# Patient Record
Sex: FEMALE | Race: WHITE | NOT HISPANIC OR LATINO | Employment: OTHER | URBAN - METROPOLITAN AREA
[De-identification: names, ages, dates, MRNs, and addresses within clinical notes are randomized per-mention and may not be internally consistent; named-entity substitution may affect disease eponyms.]

---

## 2017-08-25 ENCOUNTER — GENERIC CONVERSION - ENCOUNTER (OUTPATIENT)
Dept: OTHER | Facility: OTHER | Age: 72
End: 2017-08-25

## 2017-08-29 ENCOUNTER — ALLSCRIPTS OFFICE VISIT (OUTPATIENT)
Dept: OTHER | Facility: OTHER | Age: 72
End: 2017-08-29

## 2017-08-29 DIAGNOSIS — Z00.00 ENCOUNTER FOR GENERAL ADULT MEDICAL EXAMINATION WITHOUT ABNORMAL FINDINGS: ICD-10-CM

## 2017-08-29 DIAGNOSIS — Z12.31 ENCOUNTER FOR SCREENING MAMMOGRAM FOR MALIGNANT NEOPLASM OF BREAST: ICD-10-CM

## 2017-08-29 DIAGNOSIS — M85.80 OTHER SPECIFIED DISORDERS OF BONE DENSITY AND STRUCTURE, UNSPECIFIED SITE: ICD-10-CM

## 2017-10-05 ENCOUNTER — GENERIC CONVERSION - ENCOUNTER (OUTPATIENT)
Dept: OTHER | Facility: OTHER | Age: 72
End: 2017-10-05

## 2017-10-17 ENCOUNTER — GENERIC CONVERSION - ENCOUNTER (OUTPATIENT)
Dept: OTHER | Facility: OTHER | Age: 72
End: 2017-10-17

## 2018-01-14 VITALS
DIASTOLIC BLOOD PRESSURE: 74 MMHG | BODY MASS INDEX: 37.08 KG/M2 | SYSTOLIC BLOOD PRESSURE: 124 MMHG | HEIGHT: 62 IN | WEIGHT: 201.5 LBS

## 2018-01-15 NOTE — MISCELLANEOUS
Message   Recorded as Task   Date: 10/16/2017 09:55 PM, Created By: Bryanna Cramer   Task Name: Review Document   [facility]   Regarding Patient: Cyrilla Fothergill, Status: Active   Comment:    Bryanna Cramer - 16 Oct 2017 9:55 PM     TASK CREATED  mild osteopenia in the hip, normal lumbar spine, ca, vit D and weight bearing exercise   Keysha Zambrano - 17 Oct 2017 10:16 AM     TASK EDITED  pt informed        Active Problems    1  Breast lump (611 72) (N63)   2  Breast self examination education, encounter for (V65 49) (Z71 89)   3  Encounter for routine gynecological examination (V72 31) (Z01 419)   4  Encounter for screening mammogram for malignant neoplasm of breast (V76 12)   (Z12 31)   5  Osteopenia (733 90) (M85 80)    Current Meds   1  Diclofenac-Misoprostol 75-0 2 MG Oral Tablet Delayed Release; Therapy: (Recorded:33Qni7052) to Recorded   2  HydroCHLOROthiazide 12 5 MG Oral Capsule; Therapy: 88NFH1985 to (Last Rx:13Jan2012)  Requested for: 24YMS6720 Ordered   3  Levothyroxine Sodium 88 MCG Oral Tablet; Therapy: (Recorded:05Cae8313) to Recorded   4  Multivitamins TABS; Therapy: (Recorded:63Wcj1754) to Recorded   5  Travatan Z 0 004 % Ophthalmic Solution; Therapy: 93LQC5195 to (Last Rx:11Oct2011)  Requested for: 11Oct2011 Ordered    Allergies    1   Benadryl TABS    Signatures   Electronically signed by : Damien Su, ; Oct 17 2017 10:17AM EST                       (Author)

## 2018-07-13 ENCOUNTER — TELEPHONE (OUTPATIENT)
Dept: OBGYN CLINIC | Facility: CLINIC | Age: 73
End: 2018-07-13

## 2020-11-10 PROBLEM — E03.9 ACQUIRED HYPOTHYROIDISM: Status: ACTIVE | Noted: 2018-09-11

## 2020-11-24 ENCOUNTER — TELEPHONE (OUTPATIENT)
Dept: OTHER | Facility: OTHER | Age: 75
End: 2020-11-24

## 2020-11-25 PROCEDURE — 88305 TISSUE EXAM BY PATHOLOGIST: CPT | Performed by: PATHOLOGY

## 2020-11-27 ENCOUNTER — LAB REQUISITION (OUTPATIENT)
Dept: LAB | Facility: HOSPITAL | Age: 75
End: 2020-11-27
Payer: MEDICARE

## 2020-11-27 DIAGNOSIS — R07.9 CHEST PAIN, UNSPECIFIED: ICD-10-CM

## 2021-01-05 NOTE — PROGRESS NOTES
Assessment/Plan:    pap is up to date - she does not require a pap    mammogram reviewed with her including breast density  RX given for September  Discussed self breast exams    colon cancer screening - she just had a colonoscopy this year  Abdominal/pelvic pain- she believes this is from her diverticular disease  It seems to be getting better  We had discussed pelvic ultrasound although she is pretty sure her ovaries were removed  She did have a normal CT of her pelvis in October  Urgency, urge incontinence and stress incontinence-we reviewed avoiding bladder irritants, Kegel exercises and bladder training  She was given information on this to review  If this is not helpful, she could consider pelvic floor PT or medication  DEXA ordered    discussed preventive care, regular exercise and a healthy diet      No problem-specific Assessment & Plan notes found for this encounter  Diagnoses and all orders for this visit:    Encounter for annual routine gynecological examination    Encounter for screening mammogram for breast cancer  -     Mammo screening bilateral w 3d & cad; Future    Urgency of urination    Urge incontinence    KAYLIN (stress urinary incontinence, female)    Asymptomatic menopause  -     DXA bone density spine hip and pelvis; Future    Osteopenia, unspecified location  -     DXA bone density spine hip and pelvis; Future    Other orders  -     diclofenac-misoprostol (ARTHROTEC 75) 75-0 2 MG per tablet  -     polyethylene glycol-propylene glycol (SYSTANE) 0 4-0 3 %; PRN OU  -     levothyroxine 75 mcg tablet  -     diclofenac (VOLTAREN) 75 mg EC tablet;  Take 75 mg by mouth  -     Diclofenac Sodium (VOLTAREN) 1 %; diclofenac 1 % topical gel   APPLY TWO GRAMS TO THE AFFECTED AREA(S) TOPICALLY FOUR TIMES DAILY  -     Ergocalciferol (Vitamin D2) 10 MCG (400 UNIT) TABS  -     Calcium Carbonate Antacid (Antacid) 1177 MG CHEW          Subjective:      Patient ID: Bassam Sainz is a 70 y o  female  Patient here for yearly  Her last visit was in 2017  She is having urgency and urge incontinence  She also has some KAYLIN  She wears a pad daily  She was having some abdominal and pelvic pain  She recently had a colonoscopy in E[place] and was diagnosed with diverticular disease  She is watching her diet and does feel that it is improving  She also has external rectal bleeding  She has frequent bowel movements  Normal 3D mammogram in September 2020 that showed scattered fibroglandular densities  Colonoscopy done in 2020 also  S/p hysterectomy, believes she had BSO, not sure  She had hot flashes immediately after surgery  Recent CT of pelvis showed no sign of pelvic mass and made no mention of reproductive organs  She is overdue for a DEXA scan  The following portions of the patient's history were reviewed and updated as appropriate: allergies, current medications, past family history, past medical history, past social history, past surgical history and problem list     Review of Systems   Constitutional: Negative  Gastrointestinal: Positive for abdominal pain  Genitourinary: Positive for frequency and urgency  Objective: There were no vitals taken for this visit  Physical Exam  Vitals signs reviewed  Constitutional:       Appearance: She is well-developed  Neck:      Thyroid: No thyromegaly  Trachea: No tracheal deviation  Cardiovascular:      Rate and Rhythm: Normal rate and regular rhythm  Pulmonary:      Effort: Pulmonary effort is normal       Breath sounds: Normal breath sounds  Chest:      Breasts: Breasts are symmetrical          Right: No inverted nipple, mass, nipple discharge, skin change or tenderness  Left: No inverted nipple, mass, nipple discharge, skin change or tenderness  Abdominal:      General: There is no distension  Palpations: Abdomen is soft  There is no mass  Tenderness:  There is no abdominal tenderness  Genitourinary:     Labia:         Right: No rash, tenderness, lesion or injury  Left: No rash, tenderness, lesion or injury  Vagina: Normal       Uterus: Absent  Adnexa:         Right: No mass, tenderness or fullness  Left: No mass, tenderness or fullness          Rectum: Normal       Comments:  Uterus and cervix are surgically absent

## 2021-01-07 ENCOUNTER — ANNUAL EXAM (OUTPATIENT)
Dept: OBGYN CLINIC | Facility: CLINIC | Age: 76
End: 2021-01-07
Payer: MEDICARE

## 2021-01-07 VITALS
DIASTOLIC BLOOD PRESSURE: 80 MMHG | SYSTOLIC BLOOD PRESSURE: 132 MMHG | BODY MASS INDEX: 36.25 KG/M2 | HEIGHT: 62 IN | WEIGHT: 197 LBS

## 2021-01-07 DIAGNOSIS — Z78.0 ASYMPTOMATIC MENOPAUSE: ICD-10-CM

## 2021-01-07 DIAGNOSIS — Z12.31 ENCOUNTER FOR SCREENING MAMMOGRAM FOR BREAST CANCER: ICD-10-CM

## 2021-01-07 DIAGNOSIS — N39.41 URGE INCONTINENCE: ICD-10-CM

## 2021-01-07 DIAGNOSIS — R39.15 URGENCY OF URINATION: ICD-10-CM

## 2021-01-07 DIAGNOSIS — Z01.419 ENCOUNTER FOR ANNUAL ROUTINE GYNECOLOGICAL EXAMINATION: Primary | ICD-10-CM

## 2021-01-07 DIAGNOSIS — N39.3 SUI (STRESS URINARY INCONTINENCE, FEMALE): ICD-10-CM

## 2021-01-07 DIAGNOSIS — M85.80 OSTEOPENIA, UNSPECIFIED LOCATION: ICD-10-CM

## 2021-01-07 PROCEDURE — G0101 CA SCREEN;PELVIC/BREAST EXAM: HCPCS | Performed by: OBSTETRICS & GYNECOLOGY

## 2021-01-07 RX ORDER — CHOLECALCIFEROL (VITAMIN D3) 50 MCG
CAPSULE ORAL
COMMUNITY

## 2021-01-07 RX ORDER — LEVOTHYROXINE SODIUM 0.07 MG/1
TABLET ORAL
COMMUNITY
Start: 2020-12-28

## 2021-01-07 RX ORDER — ERGOCALCIFEROL (VITAMIN D2) 10 MCG
TABLET ORAL
COMMUNITY

## 2021-01-07 RX ORDER — DICLOFENAC SODIUM AND MISOPROSTOL 75; 200 MG/1; UG/1
TABLET, DELAYED RELEASE ORAL
COMMUNITY
Start: 2020-12-05

## 2021-01-07 RX ORDER — DICLOFENAC SODIUM 75 MG/1
75 TABLET, DELAYED RELEASE ORAL
COMMUNITY

## 2021-03-10 DIAGNOSIS — Z23 ENCOUNTER FOR IMMUNIZATION: ICD-10-CM

## 2021-03-17 ENCOUNTER — IMMUNIZATIONS (OUTPATIENT)
Dept: FAMILY MEDICINE CLINIC | Facility: HOSPITAL | Age: 76
End: 2021-03-17

## 2021-03-17 DIAGNOSIS — Z23 ENCOUNTER FOR IMMUNIZATION: Primary | ICD-10-CM

## 2021-03-17 PROCEDURE — 0011A SARS-COV-2 / COVID-19 MRNA VACCINE (MODERNA) 100 MCG: CPT

## 2021-03-17 PROCEDURE — 91301 SARS-COV-2 / COVID-19 MRNA VACCINE (MODERNA) 100 MCG: CPT

## 2021-04-15 ENCOUNTER — IMMUNIZATIONS (OUTPATIENT)
Dept: FAMILY MEDICINE CLINIC | Facility: HOSPITAL | Age: 76
End: 2021-04-15

## 2021-04-15 DIAGNOSIS — Z23 ENCOUNTER FOR IMMUNIZATION: Primary | ICD-10-CM

## 2021-04-15 PROCEDURE — 91301 SARS-COV-2 / COVID-19 MRNA VACCINE (MODERNA) 100 MCG: CPT

## 2021-04-15 PROCEDURE — 0012A SARS-COV-2 / COVID-19 MRNA VACCINE (MODERNA) 100 MCG: CPT

## 2021-04-23 ENCOUNTER — HOSPITAL ENCOUNTER (OUTPATIENT)
Dept: BONE DENSITY | Facility: MEDICAL CENTER | Age: 76
Discharge: HOME | End: 2021-04-23
Payer: MEDICARE

## 2021-04-23 ENCOUNTER — OFFICE VISIT (OUTPATIENT)
Dept: URGENT CARE | Facility: MEDICAL CENTER | Age: 76
End: 2021-04-23
Payer: MEDICARE

## 2021-04-23 VITALS
HEART RATE: 76 BPM | BODY MASS INDEX: 36.44 KG/M2 | SYSTOLIC BLOOD PRESSURE: 137 MMHG | OXYGEN SATURATION: 97 % | HEIGHT: 62 IN | DIASTOLIC BLOOD PRESSURE: 63 MMHG | TEMPERATURE: 97.2 F | WEIGHT: 198 LBS | RESPIRATION RATE: 20 BRPM

## 2021-04-23 DIAGNOSIS — M25.462 PAIN AND SWELLING OF KNEE, LEFT: Primary | ICD-10-CM

## 2021-04-23 DIAGNOSIS — M85.80 OSTEOPENIA, UNSPECIFIED LOCATION: ICD-10-CM

## 2021-04-23 DIAGNOSIS — M25.562 PAIN AND SWELLING OF KNEE, LEFT: Primary | ICD-10-CM

## 2021-04-23 DIAGNOSIS — Z78.0 ASYMPTOMATIC MENOPAUSE: ICD-10-CM

## 2021-04-23 PROCEDURE — G0463 HOSPITAL OUTPT CLINIC VISIT: HCPCS | Performed by: PHYSICIAN ASSISTANT

## 2021-04-23 PROCEDURE — 77080 DXA BONE DENSITY AXIAL: CPT

## 2021-04-23 PROCEDURE — 99213 OFFICE O/P EST LOW 20 MIN: CPT | Performed by: PHYSICIAN ASSISTANT

## 2021-04-23 NOTE — PROGRESS NOTES
[facility]        NAME: Mario Alberto Smith is a 70 y o  female  : 1945    MRN: [id number]  DATE: 2021  TIME: 11:54 AM    Assessment and Plan   Pain and swelling of knee, left [M25 562, M25 462]  1  Pain and swelling of knee, left           Patient Instructions       Patient was educated on swelling in posterior left knee  Patient reported she was concerned about blood clots  Patient was educated the only way to evaluate for DVT'S is with a venous doppler  Patient was told I recommend she call PCP or go directly to ED to evaluated  Patient understood  Patient was also given a referral for ortho  Chief Complaint     Chief Complaint   Patient presents with    Knee Swelling     Patient states she has swelling behind her L knee  She does take diclofenac for same  She has need having pain for about a week  Patient was offered left knee x-rays but declined  History of Present Illness       Patient presents today complaining of swelling in left knee  Patient reports this swelling started a couple of weeks ago  Patient reports pain with ambulation and pain when touching posterior aspect of left knee and left upper thigh  Patient has a history of right total knee replacement and atelectasis  Patient is currently taking diclofenac as prescribed for pain  Patient has not receieve any left knee injections  Review of Systems   Review of Systems   Constitutional: Negative  Respiratory: Negative  Cardiovascular: Negative  Musculoskeletal:        Left knee pain and swelling  Skin:        Left lower leg varicose veins   Neurological: Negative  Psychiatric/Behavioral: Negative            Current Medications       Current Outpatient Medications:     B Complex Vitamins (B COMPLEX 1 PO), Take 1 capsule by mouth daily, Disp: , Rfl:     Bioflavonoid Products (BIOFLEX PO), Take 1 tablet by mouth daily, Disp: , Rfl:     Calcium Carbonate Antacid (Antacid) 1177 MG CHEW, , Disp: , Rfl:     diclofenac-misoprostol (ARTHROTEC 75) 75-0 2 MG per tablet, , Disp: , Rfl:     Ergocalciferol (Vitamin D2) 10 MCG (400 UNIT) TABS, , Disp: , Rfl:     Flaxseed, Linseed, (Flaxseed Oil) 1000 MG CAPS, Take 1,000 mg by mouth daily, Disp: , Rfl:     levothyroxine 75 mcg tablet, , Disp: , Rfl:     Multiple Vitamin (MULTIVITAMINS PO), Take 1 tablet by mouth daily, Disp: , Rfl:     Omega-3 Fatty Acids (fish oil) 1,000 mg, Take 1 g by mouth daily, Disp: , Rfl:     Probiotic Product (PROBIOTIC DAILY PO), Take 1 tablet by mouth daily, Disp: , Rfl:     travoprost (TRAVATAN-Z) 0 004 % ophthalmic solution, Apply 1 drop to eye daily at bedtime, Disp: , Rfl:     BETA CAROTENE PO, Take 2 tablets by mouth daily, Disp: , Rfl:     Cholecalciferol 25 MCG (1000 UT) tablet, Take 1,000 Units by mouth daily, Disp: , Rfl:     diclofenac (VOLTAREN) 75 mg EC tablet, Take 75 mg by mouth, Disp: , Rfl:     Diclofenac Sodium (VOLTAREN) 1 %, diclofenac 1 % topical gel  APPLY TWO GRAMS TO THE AFFECTED AREA(S) TOPICALLY FOUR TIMES DAILY, Disp: , Rfl:     DICLOFENAC-MISOPROSTOL PO, Take 1 tablet by mouth daily, Disp: , Rfl:     hydrochlorothiazide (MICROZIDE) 12 5 mg capsule, Take 12 5 mg by mouth daily, Disp: , Rfl:     levothyroxine 88 mcg tablet, Take 75 mcg by mouth daily , Disp: , Rfl:     polyethylene glycol-propylene glycol (SYSTANE) 0 4-0 3 %, PRN OU, Disp: , Rfl:     Suprep Bowel Prep Kit 17 5-3 13-1 6 GM/177ML SOLN, Take 1 Bottle by mouth once for 1 dose, Disp: 1 Bottle, Rfl: 0    Current Allergies     Allergies as of 04/23/2021 - Reviewed 04/23/2021   Allergen Reaction Noted    Terbinafine Other (See Comments) 09/11/2018    Wound dressing adhesive Rash 11/11/2020    Medical tape Rash 05/13/2015            The following portions of the patient's history were reviewed and updated as appropriate: allergies, current medications, past family history, past medical history, past social history, past surgical history and problem list      Past Medical History:   Diagnosis Date    Fibroid     Hypothyroidism     Osteoarthritis        Past Surgical History:   Procedure Laterality Date    CHOLECYSTECTOMY      COLONOSCOPY  11/2020    diverticulosis and grade II hemorrhoids    COLONOSCOPY  2013    focal acute inflammation  Dr Ben Juarez EGD  11/2020    multiple fundic gland polyps  Biopsies negative for H  pylori    FLEXIBLE SIGMOIDOSCOPY  12/2015    hemorrhoids, diverticulosis, hyperplastic rectal polyp removed    HYSTERECTOMY      REPLACEMENT TOTAL KNEE Right        Family History   Problem Relation Age of Onset    Breast cancer Mother     Brain cancer Mother     Lung cancer Mother          Medications have been verified  Objective   /63   Pulse 76   Temp (!) 97 2 °F (36 2 °C)   Resp 20   Ht 5' 2" (1 575 m)   Wt 89 8 kg (198 lb)   SpO2 97%   BMI 36 21 kg/m²   No LMP recorded  Patient is postmenopausal        Physical Exam     Physical Exam  Constitutional:       Appearance: She is normal weight  HENT:      Head: Normocephalic  Cardiovascular:      Rate and Rhythm: Normal rate and regular rhythm  Heart sounds: Normal heart sounds  Pulmonary:      Breath sounds: Normal breath sounds  Musculoskeletal:      Comments: No pain or tenderness in left calf  Swelling noted on posterior aspect of left knee  Pain over medial and lateral joint line of left knee  Left knee range of motion intact  Pain over posterior aspect of left knee  NO laxity with valgus or varus stress in left knee  Negative Segundo in left knee  Negative SLR in left knee  No pain with left knee resisted knee flexion and extension  Palpable mass noted on lower right posterior thigh  Neurological:      Mental Status: She is alert and oriented to person, place, and time     Psychiatric:         Mood and Affect: Mood normal          Behavior: Behavior normal

## 2021-04-23 NOTE — PATIENT INSTRUCTIONS
Patient was educated on swelling in posterior left knee  Patient reported she was concerned about blood clots  Patient was educated the only way to evaluate for DVT'S is with a venous doppler  Patient was told I recommend she call PCP or go directly to ED to evaluated  Patient understood  Patient was also given a referral for ortho  Swollen Joint   WHAT YOU NEED TO KNOW:   Joint swelling may occur in one or more joints  You may have other symptoms, such as pain, tenderness, or stiffness  A swollen joint may be caused by a variety of conditions such as arthritis, pseudogout, gout, tendinitis, or injury  DISCHARGE INSTRUCTIONS:   Return to the emergency department if:   · You cannot move your joint at all  · You have severe pain that does not get better with medicine  Contact your healthcare provider if:   · You have a fever  · You have redness or warmth over the joint  · The swelling does not decrease with treatment  · You have questions or concerns about your condition or care  Medicines:   · NSAIDs , such as ibuprofen, help decrease swelling, pain, and fever  This medicine is available with or without a doctor's order  NSAIDs can cause stomach bleeding or kidney problems in certain people  If you take blood thinner medicine, always ask your healthcare provider if NSAIDs are safe for you  Always read the medicine label and follow directions  · Take your medicine as directed  Contact your healthcare provider if you think your medicine is not helping or if you have side effects  Tell him of her if you are allergic to any medicine  Keep a list of the medicines, vitamins, and herbs you take  Include the amounts, and when and why you take them  Bring the list or the pill bottles to follow-up visits  Carry your medicine list with you in case of an emergency  Follow up with your healthcare provider as directed:  Write down your questions so you remember to ask them during your visits  Self-care:   · Rest  your swollen joint  Avoid activities that make the swelling or pain worse  You may need to avoid putting weight on your joint while you have pain  Crutches or a walker can be used to avoid putting weight on joints in your lower body  · Apply ice  on your swollen joint for 15 to 20 minutes every hour or as directed  Use an ice pack, or put crushed ice in a plastic bag  Cover it with a towel  Ice helps prevent tissue damage and decreases swelling and pain  · Apply heat  on your swollen joint for 20 to 30 minutes every 2 hours for as many days as directed  Heat helps decrease pain  · Elevate  your swollen joint above the level of your heart as often as you can  This will help decrease swelling and pain  Prop your joint on pillows or blankets to keep it elevated comfortably  © Copyright [address] Information is for End User's use only and may not be sold, redistributed or otherwise used for commercial purposes  All illustrations and images included in CareNotes® are the copyrighted property of A D A M , Inc  or [address] [place], [place] [address]darlene   The above information is an  only  It is not intended as medical advice for individual conditions or treatments  Talk to your doctor, nurse or pharmacist before following any medical regimen to see if it is safe and effective for you

## 2021-05-25 ENCOUNTER — TELEPHONE (OUTPATIENT)
Dept: OBGYN CLINIC | Facility: HOSPITAL | Age: 76
End: 2021-05-25

## 2021-05-25 NOTE — TELEPHONE ENCOUNTER
Patient is calling in wanting to make an appointment to be seen with Rheumatology in which I provided soonest appointment we had for a new patient and she stated that was too long, she cannot wait that long

## 2022-10-07 DIAGNOSIS — Z12.31 ENCOUNTER FOR SCREENING MAMMOGRAM FOR BREAST CANCER: ICD-10-CM

## 2023-01-09 RX ORDER — METHOTREXATE 25 MG/ML
INJECTION, SOLUTION INTRA-ARTERIAL; INTRAMUSCULAR; INTRAVENOUS WEEKLY
COMMUNITY

## 2023-01-09 NOTE — PRE-PROCEDURE INSTRUCTIONS
Pre-Surgery Instructions:   Medication Instructions   • B Complex Vitamins (B COMPLEX 1 PO) Hold until after surgery   • BETA CAROTENE PO Hold until after surgery   • Bioflavonoid Products (BIOFLEX PO) Hold until after surgery   • Calcium Carbonate Antacid (Antacid) 1177 MG CHEW Hold day of surgery  • Cholecalciferol 25 MCG (1000 UT) tablet Hold until after surgery   • CORAL CALCIUM PO Hold until after surgery   • Ergocalciferol (Vitamin D2) 10 MCG (400 UNIT) TABS Hold until after surgery   • Flaxseed, Linseed, (Flaxseed Oil) 1000 MG CAPS Hold until after surgery   • folic acid (FOLVITE) 1 mg tablet Hold until after surgery   • Glucosamine-Chondroitin (COSAMIN DS PO) Hold until after surgery   • levothyroxine 75 mcg tablet Take day of surgery  • methotrexate 50 MG/2ML injection Instructions provided by MD   • Multiple Vitamin (MULTIVITAMINS PO) Hold until after surgery   • Omega-3 Fatty Acids (fish oil) 1,000 mg Hold until after surgery   • polyethylene glycol-propylene glycol (SYSTANE) 0 4-0 3 % Hold day of surgery  • Probiotic Product (PROBIOTIC DAILY PO) Hold day of surgery  • travoprost (TRAVATAN-Z) 0 004 % ophthalmic solution Take day of surgery  - Reviewed with patient, in detail, instructions from "My Surgical Experience"  - Instructed to avoid all OTC vitamins/supplements and NSAIDS from now until after surgery per anesthesia guidelines  Tylenol ok to take PRN  - Advised patient nothing eat or drink after midnight prior to surgery, except medications that he/she is to take morning DOS with only small sip of water     - Advised patient that Luis Stark will call with surgery arrival time and hospital directions the business day prior to surgery   Patient verbalized understanding of current visitor restrictions/masking guidelines and advised that he/she can confirm these at time of arrival call with Luis Stark      - Patient verbalized understanding and knows to call surgeon's office with any additional questions prior to surgery  Instructed to call surgeon's office in meantime with any new illnesses/exposure, patient verbalized understanding

## 2023-01-10 ENCOUNTER — ANESTHESIA EVENT (OUTPATIENT)
Dept: PERIOP | Facility: HOSPITAL | Age: 78
End: 2023-01-10

## 2023-01-10 PROBLEM — K21.9 GASTROESOPHAGEAL REFLUX DISEASE: Status: ACTIVE | Noted: 2023-01-10

## 2023-01-10 PROBLEM — H40.9 GLAUCOMA: Status: ACTIVE | Noted: 2023-01-10

## 2023-01-10 PROBLEM — R56.9 SEIZURES (HCC): Status: ACTIVE | Noted: 2023-01-10

## 2023-01-10 PROBLEM — M06.9 RHEUMATOID ARTHRITIS (HCC): Status: ACTIVE | Noted: 2023-01-10

## 2023-01-10 NOTE — ANESTHESIA PREPROCEDURE EVALUATION
Procedure:  REMOVE SHOULDER LESION WITH LOCAL FLAP (Left: Shoulder)    Relevant Problems   ENDO   (+) Acquired hypothyroidism      GI/HEPATIC   (+) Gastroesophageal reflux disease      MUSCULOSKELETAL   (+) Rheumatoid arthritis (HCC)      NEURO/PSYCH   (+) Seizures (HCC)      Musculoskeletal and Integument   (+) Osteopenia        Physical Exam    Airway    Mallampati score: II  TM Distance: >3 FB  Neck ROM: full     Dental       Cardiovascular  Cardiovascular exam normal    Pulmonary  Pulmonary exam normal     Other Findings        Anesthesia Plan  ASA Score- 2     Anesthesia Type- IV sedation with anesthesia with ASA Monitors  Additional Monitors:   Airway Plan:           Plan Factors-Exercise tolerance (METS): >4 METS  Chart reviewed  EKG reviewed  Existing labs reviewed  Patient summary reviewed  Patient is not a current smoker  Patient not instructed to abstain from smoking on day of procedure  Patient did not smoke on day of surgery  Induction-     Postoperative Plan- Plan for postoperative opioid use  Informed Consent- Anesthetic plan and risks discussed with patient and spouse  I personally reviewed this patient with the CRNA  Discussed and agreed on the Anesthesia Plan with the CRNA  Cody Farr           No results found for: HGBA1C    No results found for: NA, K, CL, CO2, ANIONGAP, BUN, CREATININE, GLUCOSE, GLUF, CALCIUM, CORRECTEDCA, AST, ALT, ALKPHOS, PROT, BILITOT, EGFR    No results found for: WBC, HGB, HCT, MCV, PLT  ekg NSR with RBBB    Cbc and bmp noted

## 2023-01-11 ENCOUNTER — ANESTHESIA (OUTPATIENT)
Dept: PERIOP | Facility: HOSPITAL | Age: 78
End: 2023-01-11

## 2023-01-11 ENCOUNTER — HOSPITAL ENCOUNTER (OUTPATIENT)
Facility: HOSPITAL | Age: 78
Setting detail: OUTPATIENT SURGERY
Discharge: HOME | End: 2023-01-11
Attending: PLASTIC SURGERY | Admitting: PLASTIC SURGERY

## 2023-01-11 VITALS
BODY MASS INDEX: 36.99 KG/M2 | OXYGEN SATURATION: 97 % | WEIGHT: 201 LBS | HEIGHT: 62 IN | SYSTOLIC BLOOD PRESSURE: 132 MMHG | TEMPERATURE: 97.4 F | HEART RATE: 69 BPM | RESPIRATION RATE: 16 BRPM | DIASTOLIC BLOOD PRESSURE: 64 MMHG

## 2023-01-11 DIAGNOSIS — C43.62 MALIGNANT MELANOMA OF LEFT UPPER LIMB, INCLUDING SHOULDER (HCC): ICD-10-CM

## 2023-01-11 RX ORDER — KETAMINE HCL IN NACL, ISO-OSM 100MG/10ML
SYRINGE (ML) INJECTION AS NEEDED
Status: DISCONTINUED | OUTPATIENT
Start: 2023-01-11 | End: 2023-01-11

## 2023-01-11 RX ORDER — FENTANYL CITRATE 50 UG/ML
INJECTION, SOLUTION INTRAMUSCULAR; INTRAVENOUS AS NEEDED
Status: DISCONTINUED | OUTPATIENT
Start: 2023-01-11 | End: 2023-01-11

## 2023-01-11 RX ORDER — CEFAZOLIN SODIUM 2 G/50ML
2000 SOLUTION INTRAVENOUS ONCE
Status: COMPLETED | OUTPATIENT
Start: 2023-01-11 | End: 2023-01-11

## 2023-01-11 RX ORDER — ONDANSETRON 4 MG/1
4 TABLET, ORALLY DISINTEGRATING ORAL EVERY 6 HOURS PRN
Status: DISCONTINUED | OUTPATIENT
Start: 2023-01-11 | End: 2023-01-11 | Stop reason: HOSPADM

## 2023-01-11 RX ORDER — ACETAMINOPHEN 325 MG/1
TABLET ORAL
Status: DISCONTINUED
Start: 2023-01-11 | End: 2023-01-11 | Stop reason: HOSPADM

## 2023-01-11 RX ORDER — SODIUM CHLORIDE, SODIUM LACTATE, POTASSIUM CHLORIDE, CALCIUM CHLORIDE 600; 310; 30; 20 MG/100ML; MG/100ML; MG/100ML; MG/100ML
INJECTION, SOLUTION INTRAVENOUS CONTINUOUS PRN
Status: DISCONTINUED | OUTPATIENT
Start: 2023-01-11 | End: 2023-01-11

## 2023-01-11 RX ORDER — SODIUM CHLORIDE, SODIUM LACTATE, POTASSIUM CHLORIDE, CALCIUM CHLORIDE 600; 310; 30; 20 MG/100ML; MG/100ML; MG/100ML; MG/100ML
75 INJECTION, SOLUTION INTRAVENOUS CONTINUOUS
Status: DISCONTINUED | OUTPATIENT
Start: 2023-01-11 | End: 2023-01-11 | Stop reason: HOSPADM

## 2023-01-11 RX ORDER — FENTANYL CITRATE/PF 50 MCG/ML
25 SYRINGE (ML) INJECTION
Status: DISCONTINUED | OUTPATIENT
Start: 2023-01-11 | End: 2023-01-11 | Stop reason: HOSPADM

## 2023-01-11 RX ORDER — ONDANSETRON 2 MG/ML
4 INJECTION INTRAMUSCULAR; INTRAVENOUS ONCE AS NEEDED
Status: DISCONTINUED | OUTPATIENT
Start: 2023-01-11 | End: 2023-01-11 | Stop reason: HOSPADM

## 2023-01-11 RX ORDER — PROPOFOL 10 MG/ML
INJECTION, EMULSION INTRAVENOUS CONTINUOUS PRN
Status: DISCONTINUED | OUTPATIENT
Start: 2023-01-11 | End: 2023-01-11

## 2023-01-11 RX ORDER — MAGNESIUM HYDROXIDE 1200 MG/15ML
LIQUID ORAL AS NEEDED
Status: DISCONTINUED | OUTPATIENT
Start: 2023-01-11 | End: 2023-01-11 | Stop reason: HOSPADM

## 2023-01-11 RX ORDER — LIDOCAINE HYDROCHLORIDE AND EPINEPHRINE 10; 10 MG/ML; UG/ML
INJECTION, SOLUTION INFILTRATION; PERINEURAL AS NEEDED
Status: DISCONTINUED | OUTPATIENT
Start: 2023-01-11 | End: 2023-01-11 | Stop reason: HOSPADM

## 2023-01-11 RX ORDER — ACETAMINOPHEN 325 MG/1
650 TABLET ORAL ONCE
Status: COMPLETED | OUTPATIENT
Start: 2023-01-11 | End: 2023-01-11

## 2023-01-11 RX ORDER — SODIUM CHLORIDE, SODIUM LACTATE, POTASSIUM CHLORIDE, CALCIUM CHLORIDE 600; 310; 30; 20 MG/100ML; MG/100ML; MG/100ML; MG/100ML
50 INJECTION, SOLUTION INTRAVENOUS CONTINUOUS
Status: DISCONTINUED | OUTPATIENT
Start: 2023-01-11 | End: 2023-01-11 | Stop reason: HOSPADM

## 2023-01-11 RX ADMIN — FENTANYL CITRATE 25 MCG: 50 INJECTION, SOLUTION INTRAMUSCULAR; INTRAVENOUS at 08:22

## 2023-01-11 RX ADMIN — CEFAZOLIN SODIUM 2000 MG: 2 SOLUTION INTRAVENOUS at 07:49

## 2023-01-11 RX ADMIN — Medication 10 MG: at 08:31

## 2023-01-11 RX ADMIN — FENTANYL CITRATE 25 MCG: 50 INJECTION, SOLUTION INTRAMUSCULAR; INTRAVENOUS at 08:31

## 2023-01-11 RX ADMIN — ACETAMINOPHEN 650 MG: 325 TABLET ORAL at 10:15

## 2023-01-11 RX ADMIN — Medication 10 MG: at 08:20

## 2023-01-11 RX ADMIN — SODIUM CHLORIDE, SODIUM LACTATE, POTASSIUM CHLORIDE, AND CALCIUM CHLORIDE 50 ML/HR: 600; 310; 30; 20 INJECTION, SOLUTION INTRAVENOUS at 06:46

## 2023-01-11 RX ADMIN — SODIUM CHLORIDE, SODIUM LACTATE, POTASSIUM CHLORIDE, AND CALCIUM CHLORIDE: .6; .31; .03; .02 INJECTION, SOLUTION INTRAVENOUS at 07:45

## 2023-01-11 RX ADMIN — FENTANYL CITRATE 50 MCG: 50 INJECTION, SOLUTION INTRAMUSCULAR; INTRAVENOUS at 07:50

## 2023-01-11 RX ADMIN — PROPOFOL 90 MCG/KG/MIN: 10 INJECTION, EMULSION INTRAVENOUS at 07:56

## 2023-01-11 NOTE — ANESTHESIA POSTPROCEDURE EVALUATION
Post-Op Assessment Note    CV Status:  Stable    Pain management: adequate     Mental Status:  Alert and awake   Hydration Status:  Euvolemic   PONV Controlled:  Controlled   Airway Patency:  Patent      Post Op Vitals Reviewed: Yes      Staff: CRNA         No notable events documented      /74 (01/11/23 0928)    Temp (!) 97 2 °F (36 2 °C) (01/11/23 0928)    Pulse 76 (01/11/23 0928)   Resp 16 (01/11/23 0928)    SpO2 96 % (01/11/23 0928)

## 2023-01-11 NOTE — OP NOTE
OPERATIVE REPORT  PATIENT NAME: Robbie Mark    :  1945  MRN: [id number]  Pt Location:  OR ROOM 08    SURGERY DATE: 2023    Surgeon(s) and Role:     * Jocelynn Crane MD - Primary    Preop and postoperative diagnosis:Malignant melanoma of left posterior shoulder    Operative Procedure(s) (LRB):  REMOVE POSTERIOR SHOULDER LESION WITH LOCAL FLAP (Left) Kenton advancement flap    Specimen(s):  ID Type Source Tests Collected by Time Destination   1 : LEFT POSTERIOR SHOULDER LESION Tissue Lesion TISSUE EXAM Jocelynn Crane MD 2023 [id number]      Operative history: The patient had a thin melanoma of the left posterior shoulder on biopsy  The biopsy site measured 15 mm in diameter  2 5 cm were taken about the lesion for excision going down to the deep fascia  To close the defect a Kenton advancement flap from the more anterior back was advanced  The size of the flap was approximately 6 cm in vertical height x15 cm in width  Operative procedure: The patient was taken to the operating placed in a lateral position with her right side down the operating table  She was prepped and draped in usual fashion  Anesthesia was general supplemented with Xylocaine with 1% with epinephrine  Wide excision of the melanoma biopsy site was then done as described using the Bovie for cutting coagulation purposes down to the deep fascia  A Keystone flap in the usual fashion were then designed inferior to this  The sides of the flap were incised  Again using the Bovie of cutting coagulation purposes the superficial fat layer was divided to allow advancement of the flap  The lesser curvature was sewn in place to the upper end of the defect using 2-0 Vicryl interrupted simple subcutaneous sutures and 3-0 nylon interrupted vertical mattress skin sutures  The other sites were also closed with 3-0 nylon interrupted vertical mattress sutures  Light dressings were applied    The patient Toller procedure well taken to recovery in good condition        SIGNATURE: Nupur Brito MD  DATE: January 11, 2023  TIME: 9:38 AM

## 2023-01-11 NOTE — NURSING NOTE
Pain subsided, OOB and ambulated to bathroom and voided  Discharge instructions reviewed and  to drive home   Tolerating po well

## 2023-01-11 NOTE — DISCHARGE INSTR - AVS FIRST PAGE
1   Does not need to cover back wound, may just draped with handkerchief for washcloth as needed for comfort  2  Return to clinic Tuesday 17 January  3    May shower

## 2023-01-11 NOTE — INTERVAL H&P NOTE
H&P reviewed  After examining the patient I find no changes in the patients condition since the H&P had been written      Vitals:    01/11/23 0616   BP: 144/88   Pulse: 58   Resp: 20   Temp: (!) 97 1 °F (36 2 °C)   SpO2: 99%

## 2023-03-23 ENCOUNTER — ANNUAL EXAM (OUTPATIENT)
Dept: GYNECOLOGY | Facility: CLINIC | Age: 78
End: 2023-03-23

## 2023-03-23 VITALS
DIASTOLIC BLOOD PRESSURE: 74 MMHG | HEIGHT: 62 IN | SYSTOLIC BLOOD PRESSURE: 118 MMHG | WEIGHT: 198.8 LBS | BODY MASS INDEX: 36.58 KG/M2

## 2023-03-23 DIAGNOSIS — Z12.31 ENCOUNTER FOR SCREENING MAMMOGRAM FOR BREAST CANCER: ICD-10-CM

## 2023-03-23 DIAGNOSIS — M85.852 OSTEOPENIA OF NECK OF LEFT FEMUR: ICD-10-CM

## 2023-03-23 DIAGNOSIS — Z01.419 ENCOUNTER FOR ANNUAL ROUTINE GYNECOLOGICAL EXAMINATION: Primary | ICD-10-CM

## 2023-03-23 DIAGNOSIS — R22.2 ABDOMINAL WALL MASS: ICD-10-CM

## 2023-03-23 RX ORDER — METHOTREXATE 25 MG/ML
0.8 INJECTION INTRA-ARTERIAL; INTRAMUSCULAR; INTRATHECAL; INTRAVENOUS
COMMUNITY
Start: 2023-03-21

## 2023-03-23 NOTE — PROGRESS NOTES
Assessment/Plan:    She does not require a Pap    mammogram reviewed with her including breast density  RX given for September  Discussed self breast exams    colon cancer screening-colonoscopy done in November    Osteopenia-discussed exercise, calcium and D  DEXA ordered and she will schedule this  She could not find the pea-sized masses below her ribs that she fell at home however she feels this larger area that is also tender  An abdominal wall ultrasound was ordered  She may need to follow-up with Dr Benitez Russian    discussed preventive care, regular exercise and a healthy diet      No problem-specific Assessment & Plan notes found for this encounter  Diagnoses and all orders for this visit:    Encounter for annual routine gynecological examination    Encounter for screening mammogram for breast cancer  -     Mammo screening bilateral w 3d & cad; Future    Osteopenia of neck of left femur  -     DXA bone density spine hip and pelvis; Future    Other orders  -     Methotrexate Sodium (methotrexate, PF,) 50 mg/2 mL injection; 0 8 mg          Subjective:      Patient ID: José Antonio Rahman is a 78 y o  female  Patient here for yearly  She has no GYN complaints  About a month and a half ago, she noticed 3 pea-sized areas under the skin on her abdomen in the left upper quadrant  These were tender  She does not think they were there before  Normal 3D mammogram in September with scattered fibroglandular densities  Mild osteopenia at all sites on DEXA from April 2021  Status post hysterectomy, probable BSO    She just had 2 melanomas excised          The following portions of the patient's history were reviewed and updated as appropriate: allergies, current medications, past family history, past medical history, past social history, past surgical history and problem list     Review of Systems   Constitutional: Negative  Gastrointestinal: Negative  Genitourinary: Negative           Abdominal wall masses         Objective: There were no vitals taken for this visit  Physical Exam  Vitals reviewed  Constitutional:       Appearance: She is well-developed  Neck:      Thyroid: No thyromegaly  Trachea: No tracheal deviation  Cardiovascular:      Rate and Rhythm: Normal rate and regular rhythm  Pulmonary:      Effort: Pulmonary effort is normal       Breath sounds: Normal breath sounds  Chest:   Breasts:     Breasts are symmetrical       Right: No inverted nipple, mass, nipple discharge, skin change or tenderness  Left: No inverted nipple, mass, nipple discharge, skin change or tenderness  Abdominal:      General: There is no distension  Palpations: Abdomen is soft  There is no mass  Tenderness: There is no abdominal tenderness  Comments: Patient could not feel the 3 small masses however after we finished the exam, she noted 1 that was tender lateral and superior to the umbilicus on her left side  This area was tender although it did not really feel like a discrete mass  Approximately 2 cm   Genitourinary:     Labia:         Right: No rash, tenderness, lesion or injury  Left: No rash, tenderness, lesion or injury  Vagina: Normal       Uterus: Absent  Adnexa:         Right: No mass, tenderness or fullness  Left: No mass, tenderness or fullness          Rectum: Normal       Comments: Vaginal cuff is normal

## 2023-04-05 ENCOUNTER — HOSPITAL ENCOUNTER (OUTPATIENT)
Dept: ULTRASOUND IMAGING | Facility: MEDICAL CENTER | Age: 78
Discharge: HOME | End: 2023-04-05

## 2023-04-05 DIAGNOSIS — R22.2 ABDOMINAL WALL MASS: ICD-10-CM

## 2023-04-27 ENCOUNTER — TELEPHONE (OUTPATIENT)
Dept: GYNECOLOGY | Facility: CLINIC | Age: 78
End: 2023-04-27

## 2023-04-27 NOTE — TELEPHONE ENCOUNTER
Patient called for her US result  Her MyChart wasn't working  Informed patient of Dr Jez Posey advice to follow up with her PCP  Patient also states that she called [facility] and fixed her MyChart account

## 2023-05-16 ENCOUNTER — HOSPITAL ENCOUNTER (OUTPATIENT)
Dept: BONE DENSITY | Facility: MEDICAL CENTER | Age: 78
Discharge: HOME | End: 2023-05-16

## 2023-05-16 DIAGNOSIS — M85.852 OSTEOPENIA OF NECK OF LEFT FEMUR: ICD-10-CM

## 2024-04-27 ENCOUNTER — APPOINTMENT (OUTPATIENT)
Dept: RADIOLOGY | Facility: MEDICAL CENTER | Age: 79
End: 2024-04-27
Attending: PHYSICIAN ASSISTANT
Payer: MEDICARE

## 2024-04-27 ENCOUNTER — OFFICE VISIT (OUTPATIENT)
Dept: URGENT CARE | Facility: MEDICAL CENTER | Age: 79
End: 2024-04-27
Payer: MEDICARE

## 2024-04-27 VITALS
SYSTOLIC BLOOD PRESSURE: 145 MMHG | RESPIRATION RATE: 18 BRPM | OXYGEN SATURATION: 98 % | HEART RATE: 71 BPM | DIASTOLIC BLOOD PRESSURE: 66 MMHG | TEMPERATURE: 97.2 F

## 2024-04-27 DIAGNOSIS — M25.561 ACUTE PAIN OF RIGHT KNEE: ICD-10-CM

## 2024-04-27 DIAGNOSIS — S20.212A RIB CONTUSION, LEFT, INITIAL ENCOUNTER: Primary | ICD-10-CM
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
## 2024-04-27 PROCEDURE — G0463 HOSPITAL OUTPT CLINIC VISIT: HCPCS | Performed by: PHYSICIAN ASSISTANT

## 2024-04-27 PROCEDURE — 73564 X-RAY EXAM KNEE 4 OR MORE: CPT

## 2024-04-27 PROCEDURE — 99213 OFFICE O/P EST LOW 20 MIN: CPT | Performed by: PHYSICIAN ASSISTANT

## 2024-04-27 PROCEDURE — 71101 X-RAY EXAM UNILAT RIBS/CHEST: CPT

## 2024-04-27 RX ORDER — NEEDLES, SAFETY 22GX1 1/2"
NEEDLE, DISPOSABLE MISCELLANEOUS
COMMUNITY
Start: 2024-04-20

## 2024-04-27 RX ORDER — CYCLOBENZAPRINE HCL 10 MG
1 TABLET ORAL DAILY PRN
COMMUNITY

## 2024-04-27 NOTE — PROGRESS NOTES
[facility]        NAME: Leticia Bellamy is a 78 y.o. female  : 1945    MRN: [id number]  DATE: 2024  TIME: 4:28 PM    Assessment and Plan   Rib contusion, left, initial encounter [S20.212A]  1. Rib contusion, left, initial encounter        2. Fall, initial encounter  XR ribs left w pa chest min 3 views    XR knee 4+ vw right injury      3. Acute pain of right knee              Patient Instructions       Follow up with PCP as I encouraged her to follow-up about the near syncopal episode.  I cautioned her about sudden movements of her neck and getting out of bed suddenly.  X-ray of her right knee shows no acute change as that is the left rib area.  She is going to get a walker to utilize.    If tests have been performed at [facility], our office will contact you with results if changes need to be made to the care plan discussed with you at the visit.  [facility]    Chief Complaint     Chief Complaint   Patient presents with    Fall     Patient c/o right knee pain and left side rib pain after she fell yesterday.          History of Present Illness       Patient was standing having her head fully bent and suddenly pulled it back and she fell onto the floor.  She hit her ribs against the shower stall and her right knee onto the floor.  She complains of pain in the left rib area especially when she laughs, coughs, takes a deep breath.  Her pain is a 6/10 and sharp and stabbing without radiation.  Her right knee pain is anterior more over the patella.  It is 4/10 and bothers her more when she tries to ambulate.    Fall  The accident occurred 2 days ago. The fall occurred while standing. She fell from a height of 1 to 2 ft. She landed on Hard floor. There was no blood loss. The point of impact was the right knee. The pain is moderate. The symptoms are aggravated by ambulation. She has tried acetaminophen and ice for the symptoms. The treatment provided mild  "relief.       Review of Systems   Review of Systems   All other systems reviewed and are negative.        Current Medications       Current Outpatient Medications:     B-D TB SYRINGE 1CC/27GX1/2\" 27G X 1/2\" 1 ML MISC, FOR USE WEEKLY WITH INJECTABLE METHOTREXATE, Disp: , Rfl:     Multiple Vitamins-Minerals (PRESERVISION AREDS 2+MULTI VIT PO), , Disp: , Rfl:     B Complex Vitamins (B COMPLEX 1 PO), Take 1 capsule by mouth daily, Disp: , Rfl:     BETA CAROTENE PO, Take 2 tablets by mouth daily, Disp: , Rfl:     Cholecalciferol 25 MCG (1000 UT) tablet, Take 1,000 Units by mouth daily, Disp: , Rfl:     CORAL CALCIUM PO, Take 150 mg by mouth daily, Disp: , Rfl:     cyclobenzaprine (FLEXERIL) 10 mg tablet, Take 1 tablet by mouth daily as needed, Disp: , Rfl:     Ergocalciferol (Vitamin D2) 10 MCG (400 UNIT) TABS, , Disp: , Rfl:     Flaxseed, Linseed, (Flaxseed Oil) 1000 MG CAPS, Take 1,000 mg by mouth daily, Disp: , Rfl:     folic acid (FOLVITE) 1 mg tablet, folic acid 1 mg tablet  take 1 tablet by mouth once daily, Disp: , Rfl:     Glucosamine-Chondroitin (COSAMIN DS PO), Take 1,500 mg by mouth, Disp: , Rfl:     levothyroxine 75 mcg tablet, Take 75 mcg by mouth daily in the early morning, Disp: , Rfl:     methotrexate 50 MG/2ML injection, Inject under the skin once a week Weekly on Wednesdays, Disp: , Rfl:     Methotrexate Sodium (methotrexate, PF,) 50 mg/2 mL injection, 0.8 mg, Disp: , Rfl:     Multiple Vitamin (MULTIVITAMINS PO), Take 1 tablet by mouth daily, Disp: , Rfl:     Omega-3 Fatty Acids (fish oil) 1,000 mg, Take 1 g by mouth daily, Disp: , Rfl:     Probiotic Product (PROBIOTIC DAILY PO), Take 1 tablet by mouth daily, Disp: , Rfl:     travoprost (TRAVATAN-Z) 0.004 % ophthalmic solution, Apply 1 drop to eye daily at bedtime, Disp: , Rfl:     Current Allergies     Allergies as of 04/27/2024 - Reviewed 03/23/2023   Allergen Reaction Noted    Terbinafine Other (See Comments) 09/11/2018    Diphenhydramine Other (See " Comments) 08/12/2013    Medical tape Rash 05/13/2015    Wound dressing adhesive Rash 11/11/2020    Chlorpheniramine Rash 10/26/2021    Hydrocodone Rash 10/26/2021            The following portions of the patient's history were reviewed and updated as appropriate: allergies, current medications, past family history, past medical history, past social history, past surgical history and problem list.     Past Medical History:   Diagnosis Date    Cancer (HCC)     Disease of thyroid gland     Fibroid     GERD (gastroesophageal reflux disease)     Hypothyroidism     Osteoarthritis     Seizures (HCC)     as an infant       Past Surgical History:   Procedure Laterality Date    CHOLECYSTECTOMY      COLONOSCOPY  11/2020    diverticulosis and grade II hemorrhoids    COLONOSCOPY  2013    focal acute inflammation  Dr. Perez    COLONOSCOPY      EGD  11/2020    multiple fundic gland polyps.  Biopsies negative for H. pylori    FLAP LOCAL EXTREMITY Left 1/11/2023    Procedure: REMOVE POSTERIOR SHOULDER LESION WITH LOCAL FLAP;  Surgeon: Dario Santizo MD;  Location:  MAIN OR;  Service: Plastics    FLEXIBLE SIGMOIDOSCOPY  12/2015    hemorrhoids, diverticulosis, hyperplastic rectal polyp removed    HYSTERECTOMY      JOINT REPLACEMENT      REPLACEMENT TOTAL KNEE Right        Family History   Problem Relation Age of Onset    Breast cancer Mother     Brain cancer Mother     Lung cancer Mother          Medications have been verified.        Objective   /66   Pulse 71   Temp (!) 97.2 °F (36.2 °C)   Resp 18   SpO2 98%   No LMP recorded. Patient is postmenopausal.       Physical Exam     Physical Exam  Vitals and nursing note reviewed.   Constitutional:       Appearance: Normal appearance. She is obese.   Cardiovascular:      Rate and Rhythm: Normal rate and regular rhythm.      Pulses: Normal pulses.      Heart sounds: Normal heart sounds.   Pulmonary:      Effort: Pulmonary effort is normal.      Breath sounds: Normal  breath sounds.   Neurological:      Mental Status: She is alert.       Right knee full range of motion no ecchymosis no edema no effusion.  No instability.    Left rib area tender lateral and posterior between the sixth and ninth rib area.

## (undated) DEVICE — BULB SYRINGE,IRRIGATION WITH PROTECTIVE CAP: Brand: DOVER

## (undated) DEVICE — PENCIL ELECTROSURG E-Z CLEAN -0035H

## (undated) DEVICE — STERILE POLYISOPRENE POWDER-FREE SURGICAL GLOVES: Brand: PROTEXIS

## (undated) DEVICE — DRESSING XEROFORM 5 X 9

## (undated) DEVICE — ELECTRODE NEEDLE MOD E-Z CLEAN 2.75IN 7CM -0013M

## (undated) DEVICE — SCD SEQUENTIAL COMPRESSION COMFORT SLEEVE MEDIUM KNEE LENGTH: Brand: KENDALL SCD

## (undated) DEVICE — VIOLET BRAIDED (POLYGLACTIN 910), SYNTHETIC ABSORBABLE SUTURE: Brand: COATED VICRYL

## (undated) DEVICE — NEEDLE 25G X 1 1/2

## (undated) DEVICE — INTENDED FOR TISSUE SEPARATION, AND OTHER PROCEDURES THAT REQUIRE A SHARP SURGICAL BLADE TO PUNCTURE OR CUT.: Brand: BARD-PARKER SAFETY BLADES SIZE 15, STERILE

## (undated) DEVICE — ABDOMINAL PAD: Brand: DERMACEA

## (undated) DEVICE — SYRINGE 30ML LL

## (undated) DEVICE — SINGLE PORT MANIFOLD: Brand: NEPTUNE 2

## (undated) DEVICE — BETHLEHEM UNIVERSAL  MIONR EXT: Brand: CARDINAL HEALTH

## (undated) DEVICE — SPONGE LAP 18 X 18 IN STRL RFD

## (undated) DEVICE — STOCKINETTE 2P PREROLLD 6X60

## (undated) DEVICE — TUBING SUCTION 5MM X 12 FT

## (undated) DEVICE — SYRINGE 10ML LL CONTROL TOP

## (undated) DEVICE — SUT ETHILON 3-0 PS-1 18 IN 1663H

## (undated) DEVICE — STANDARD SURGICAL GOWN, L: Brand: CONVERTORS